# Patient Record
Sex: MALE | Race: WHITE | NOT HISPANIC OR LATINO | Employment: PART TIME
[De-identification: names, ages, dates, MRNs, and addresses within clinical notes are randomized per-mention and may not be internally consistent; named-entity substitution may affect disease eponyms.]

---
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
## 2023-03-06 ENCOUNTER — HOSPITAL ENCOUNTER (EMERGENCY)
Facility: CLINIC | Age: 18
Discharge: HOME | End: 2023-03-06
Attending: EMERGENCY MEDICINE | Admitting: EMERGENCY MEDICINE
Payer: COMMERCIAL

## 2023-03-06 VITALS
DIASTOLIC BLOOD PRESSURE: 84 MMHG | SYSTOLIC BLOOD PRESSURE: 130 MMHG | RESPIRATION RATE: 18 BRPM | HEART RATE: 80 BPM | OXYGEN SATURATION: 100 % | TEMPERATURE: 98.1 F

## 2023-03-06 DIAGNOSIS — G40.909 RECURRENT SEIZURES (H): ICD-10-CM

## 2023-03-06 LAB
ANION GAP SERPL CALCULATED.3IONS-SCNC: 9 MMOL/L (ref 7–15)
BASOPHILS # BLD AUTO: 0.1 10E3/UL (ref 0–0.2)
BASOPHILS NFR BLD AUTO: 1 %
BUN SERPL-MCNC: 13.3 MG/DL (ref 6–20)
CALCIUM SERPL-MCNC: 9.7 MG/DL (ref 8.6–10)
CHLORIDE SERPL-SCNC: 101 MMOL/L (ref 98–107)
CREAT SERPL-MCNC: 1.14 MG/DL (ref 0.67–1.17)
DEPRECATED HCO3 PLAS-SCNC: 28 MMOL/L (ref 22–29)
EOSINOPHIL # BLD AUTO: 0.1 10E3/UL (ref 0–0.7)
EOSINOPHIL NFR BLD AUTO: 1 %
ERYTHROCYTE [DISTWIDTH] IN BLOOD BY AUTOMATED COUNT: 14.6 % (ref 10–15)
GFR SERPL CREATININE-BSD FRML MDRD: >90 ML/MIN/1.73M2
GLUCOSE SERPL-MCNC: 82 MG/DL (ref 70–99)
HCT VFR BLD AUTO: 48.4 % (ref 40–53)
HGB BLD-MCNC: 15.8 G/DL (ref 13.3–17.7)
HOLD SPECIMEN: NORMAL
IMM GRANULOCYTES # BLD: 0 10E3/UL
IMM GRANULOCYTES NFR BLD: 0 %
LYMPHOCYTES # BLD AUTO: 2.2 10E3/UL (ref 0.8–5.3)
LYMPHOCYTES NFR BLD AUTO: 25 %
MCH RBC QN AUTO: 26 PG (ref 26.5–33)
MCHC RBC AUTO-ENTMCNC: 32.6 G/DL (ref 31.5–36.5)
MCV RBC AUTO: 80 FL (ref 78–100)
MONOCYTES # BLD AUTO: 0.7 10E3/UL (ref 0–1.3)
MONOCYTES NFR BLD AUTO: 8 %
NEUTROPHILS # BLD AUTO: 5.6 10E3/UL (ref 1.6–8.3)
NEUTROPHILS NFR BLD AUTO: 65 %
NRBC # BLD AUTO: 0 10E3/UL
NRBC BLD AUTO-RTO: 0 /100
PLATELET # BLD AUTO: 254 10E3/UL (ref 150–450)
POTASSIUM SERPL-SCNC: 4.2 MMOL/L (ref 3.4–5.3)
RBC # BLD AUTO: 6.07 10E6/UL (ref 4.4–5.9)
SODIUM SERPL-SCNC: 138 MMOL/L (ref 136–145)
TROPONIN T SERPL HS-MCNC: <6 NG/L
WBC # BLD AUTO: 8.7 10E3/UL (ref 4–11)

## 2023-03-06 PROCEDURE — 85025 COMPLETE CBC W/AUTO DIFF WBC: CPT | Performed by: EMERGENCY MEDICINE

## 2023-03-06 PROCEDURE — 80048 BASIC METABOLIC PNL TOTAL CA: CPT | Performed by: EMERGENCY MEDICINE

## 2023-03-06 PROCEDURE — 96374 THER/PROPH/DIAG INJ IV PUSH: CPT

## 2023-03-06 PROCEDURE — 99284 EMERGENCY DEPT VISIT MOD MDM: CPT | Mod: 25

## 2023-03-06 PROCEDURE — 84484 ASSAY OF TROPONIN QUANT: CPT | Performed by: EMERGENCY MEDICINE

## 2023-03-06 PROCEDURE — 250N000011 HC RX IP 250 OP 636: Performed by: EMERGENCY MEDICINE

## 2023-03-06 PROCEDURE — 93005 ELECTROCARDIOGRAM TRACING: CPT

## 2023-03-06 PROCEDURE — 36415 COLL VENOUS BLD VENIPUNCTURE: CPT | Performed by: EMERGENCY MEDICINE

## 2023-03-06 RX ORDER — LEVETIRACETAM 15 MG/ML
1500 INJECTION INTRAVASCULAR ONCE
Status: COMPLETED | OUTPATIENT
Start: 2023-03-06 | End: 2023-03-06

## 2023-03-06 RX ORDER — LEVETIRACETAM 750 MG/1
750 TABLET ORAL 2 TIMES DAILY
Qty: 180 TABLET | Refills: 0 | Status: SHIPPED | OUTPATIENT
Start: 2023-03-06

## 2023-03-06 RX ADMIN — LEVETIRACETAM 1500 MG: 15 INJECTION INTRAVENOUS at 16:53

## 2023-03-06 ASSESSMENT — ENCOUNTER SYMPTOMS
NUMBNESS: 0
HEADACHES: 0
WEAKNESS: 0
SEIZURES: 1
CHEST TIGHTNESS: 1
SHORTNESS OF BREATH: 0
ABDOMINAL PAIN: 0
VOMITING: 0

## 2023-03-06 ASSESSMENT — ACTIVITIES OF DAILY LIVING (ADL): ADLS_ACUITY_SCORE: 35

## 2023-03-06 NOTE — DISCHARGE INSTRUCTIONS
Do not drive a car until you have been cleared by a Neurologist and seizure-free for at least 3 months

## 2023-03-06 NOTE — ED NOTES
Rapid Assessment Note    History:   Josh Reinoso is a 17 year old male who presents with unwitnessed seizure yesterday. States he stopped taking his seizure medication about 3 weeks ago, as it ran out. Was prescribed Levetiracetam 750 mg which was prescribed 11/26/22. States that usually when he had seizures he didn't feel anything upon waking up but yesterday he woke up and felt a chest tightness. He denies any headache, shortness of breath, vomiting, abdominal pain, numbness, weakness.    Exam:   Nursing note and vitals reviewed.  Constitutional:  Appears well-developed and well-nourished.   HENT:   Head:    Atraumatic.   Mouth/Throat:   Oropharynx is clear and moist. No oropharyngeal exudate.   Eyes:    Pupils are equal, round, and reactive to light.   Neck:    Normal range of motion. Neck supple.      No tracheal deviation present. No thyromegaly present.   Cardiovascular:  Normal rate, regular rhythm, no murmur   Pulmonary/Chest: Breath sounds are clear and equal without wheezes or crackles.  Abdominal:   Soft. Bowel sounds are normal. Exhibits no distension and      no mass. There is no tenderness.      There is no rebound and no guarding.   Musculoskeletal:  Exhibits no edema.   Lymphadenopathy:  No cervical adenopathy.   Neurological:   Alert and oriented to person, place, and time. GCS 15.  CN 2-12 intact.  and proximal upper extremity strength strong and equal.  Bilateral lower extremity strength strong and equal, including strong dorsiflexion and plantarflexion strength.  Sensation intact and equal to the face, arms and legs.  No facial droop or weakness. Normal speech.  Follows commands and answers questions normally.    Skin:    Skin is warm and dry. No rash noted. No pallor.       ECG results from 03/06/23   EKG 12 lead     Value    Systolic Blood Pressure     Diastolic Blood Pressure     Ventricular Rate 60    Atrial Rate 60    HI Interval 182    QRS Duration 122        QTc 392    P Axis  37    R AXIS 85    T Axis 35    Interpretation ECG      Sinus rhythm  Right bundle branch block  Abnormal ECG  When compared with ECG of 13-JUL-2020 20:00,  QT has shortened         Plan of Care:   I evaluated the patient and developed an initial plan of care. I discussed this plan and explained that I, or one of my partners, would be returning to complete the evaluation.       I, Cristóbal Clarknovemerita, am serving as a scribe to document services personally performed by Eufemia Melendrez MD, based on my observations and the provider's statements to me.    3/6/2023  [facility]    Portions of this medical record were completed by a scribe. UPON MY REVIEW AND AUTHENTICATION BY ELECTRONIC SIGNATURE, this confirms (a) I performed the applicable clinical services, and (b) the record is accurate.        Eufemia Melendrez MD  03/06/23 [id number]

## 2023-03-06 NOTE — ED TRIAGE NOTES
Pt presents to ED with concerns that he is having mini seizures.  Feels spasms in his chest. Started 3 days ago. Occurring one to times each day and is awake when it occurs. Denies chest pain. Feels wheezy when it happens.   Pt reports that he was on seizure meds for history of seizure, but the seizure activity that he was started on the meds for, he was unaware of when the seizures were happening. Pt also states he does not take the seizure medicine he was on as prescribed. Has had no neuro follow up.

## 2023-03-06 NOTE — ED PROVIDER NOTES
History     Chief Complaint:  Seizures       The history is provided by the patient.      Josh Reinoso is a 17 year old male who presents with unwitnessed seizure yesterday. States he stopped taking his seizure medication about 3 weeks ago, as it ran out. Was prescribed Levetiracetam 750 mg which was prescribed 11/26/22. States that usually when he had seizures he didn't feel anything upon waking up but yesterday he woke up and felt a chest tightness. No incontinence or laceration of the tongue. He denies any headache, shortness of breath, vomiting, abdominal pain, numbness, weakness, fever/chills or cough.    Independent Historian:   None - Patient Only    Review of External Notes: none      ROS:  Review of Systems   Respiratory: Positive for chest tightness. Negative for shortness of breath.    Gastrointestinal: Negative for abdominal pain and vomiting.   Neurological: Positive for seizures. Negative for weakness, numbness and headaches.   All other systems reviewed and are negative.      Allergies:  The patient has no known allergies      Medications:    The patient has stopped taking his Levetiracetam    Past Medical History:    Seizures     Social History:   reports that he has been smoking. He has never used smokeless tobacco. He reports current alcohol use. He reports current drug use. Drug: Marijuana.  PCP: No Ref-Primary, Physician     Physical Exam     Patient Vitals for the past 24 hrs:   BP Temp Temp src Pulse Resp SpO2   03/06/23 1352 130/84 98.1  F (36.7  C) Temporal 80 18 100 %        Physical Exam  Nursing note and vitals reviewed.  Constitutional:             Appears well-developed and well-nourished.   HENT:   Head:                           Atraumatic.   Mouth/Throat:              Oropharynx is clear and moist. No laceration to the tongue. No oropharyngeal exudate.   Eyes:                           Pupils are equal, round, and reactive to light.   Neck:                           Normal range  of motion. Neck supple.                                       No tracheal deviation present. No thyromegaly present.   Cardiovascular:           Normal rate, regular rhythm, no murmur   Pulmonary/Chest:       Breath sounds are clear and equal without wheezes or crackles.  Abdominal:                  Soft. Bowel sounds are normal. Exhibits no distension and                                       no mass. There is no tenderness.                                       There is no rebound and no guarding.   Musculoskeletal:         Exhibits no edema.   Lymphadenopathy:     No cervical adenopathy.   Neurological:               Alert and oriented to person, place, and time. GCS 15.  CN 2-12 intact.  and proximal upper extremity strength strong and equal.  Bilateral lower extremity strength strong and equal, including strong dorsiflexion and plantarflexion strength.  Sensation intact and equal to the face, arms and legs.  No facial droop or weakness. Normal speech.  Follows commands and answers questions normally.   Skin:                            Skin is warm and dry. No rash noted. No pallor.     Emergency Department Course     ECG results from 03/06/23   EKG 12 lead     Value    Systolic Blood Pressure     Diastolic Blood Pressure     Ventricular Rate 60    Atrial Rate 60    WA Interval 182    QRS Duration 122        QTc 392    P Axis 37    R AXIS 85    T Axis 35    Interpretation ECG      Sinus rhythm  Right bundle branch block  Abnormal ECG  When compared with ECG of 13-JUL-2020 20:00,  QT has shortened       Laboratory:  Labs Ordered and Resulted from Time of ED Arrival to Time of ED Departure   CBC WITH PLATELETS AND DIFFERENTIAL - Abnormal       Result Value    WBC Count 8.7      RBC Count 6.07 (*)     Hemoglobin 15.8      Hematocrit 48.4      MCV 80      MCH 26.0 (*)     MCHC 32.6      RDW 14.6      Platelet Count 254      % Neutrophils 65      % Lymphocytes 25      % Monocytes 8      % Eosinophils 1       % Basophils 1      % Immature Granulocytes 0      NRBCs per 100 WBC 0      Absolute Neutrophils 5.6      Absolute Lymphocytes 2.2      Absolute Monocytes 0.7      Absolute Eosinophils 0.1      Absolute Basophils 0.1      Absolute Immature Granulocytes 0.0      Absolute NRBCs 0.0     BASIC METABOLIC PANEL - Normal    Sodium 138      Potassium 4.2      Chloride 101      Carbon Dioxide (CO2) 28      Anion Gap 9      Urea Nitrogen 13.3      Creatinine 1.14      Calcium 9.7      Glucose 82      GFR Estimate >90     TROPONIN T, HIGH SENSITIVITY        Emergency Department Course & Assessments:         Interventions:  Medications   levETIRAcetam (KEPPRA) intermittent infusion 1,500 mg (1,500 mg Intravenous $New Bag 3/6/23 165)        Assessments:  1642 I examined the patient and obtained history     Independent Interpretation (X-rays, CTs, rhythm strip):  I reviewed his previous CT head imaging from 9/8/20, which appears unremarkable.    Consultations/Discussion of Management or Tests:  None        Social Determinants of Health affecting care:   Healthcare Access/Compliance    Disposition:  The patient was discharged to home.     Impression & Plan    CMS Diagnoses: None    Medical Decision Making:  This patient has a history of seizures but ran out of his anti-seizure medication and arrives today due to a recurrent seizure.  I do not feel that any emergent/urgent brain imaging is indicated today since he does not have any headache, vomiting or any other neurologic concerns today and there has not been any head trauma. No sign of meningitis or infection.  Electrolytes and CBC are normal.  No sign of cardiac problem and his high sensitivity troponin and ECG are normal. No sign of traumatic injury.  He was loaded with IV Keppra here and discharged with his usual dose of Keppra refilled.  He was referred to follow-up with [facility] within the next week.  He was told seizure precautions and told not to  drive a car until he has been seizure-free for at least 3 months and cleared by a Neurologist.  He was told to return for any problems.    Diagnosis:    ICD-10-CM    1. Recurrent seizures (H)  G40.909            Discharge Medications:  New Prescriptions    LEVETIRACETAM (KEPPRA) 750 MG TABLET    Take 1 tablet (750 mg) by mouth 2 times daily        Scribe Disclosure:  I, Cristóbal Han, am serving as a scribe at 5:03 PM on 3/6/2023 to document services personally performed by Eufemia Melendrez MD based on my observations and the provider's statements to me.     3/6/2023   Eufemia Melendrez MD [place], Cheri Lee, MD  03/06/23 5628       Eufemia Melendrez MD  03/06/23 6880

## 2023-03-07 LAB
ATRIAL RATE - MUSE: 60 BPM
DIASTOLIC BLOOD PRESSURE - MUSE: NORMAL MMHG
INTERPRETATION ECG - MUSE: NORMAL
P AXIS - MUSE: 37 DEGREES
PR INTERVAL - MUSE: 182 MS
QRS DURATION - MUSE: 122 MS
QT - MUSE: 392 MS
QTC - MUSE: 392 MS
R AXIS - MUSE: 85 DEGREES
SYSTOLIC BLOOD PRESSURE - MUSE: NORMAL MMHG
T AXIS - MUSE: 35 DEGREES
VENTRICULAR RATE- MUSE: 60 BPM

## 2023-05-20 ENCOUNTER — MEDICAL CORRESPONDENCE (OUTPATIENT)
Dept: HEALTH INFORMATION MANAGEMENT | Facility: CLINIC | Age: 18
End: 2023-05-20
Payer: COMMERCIAL

## 2023-05-20 ENCOUNTER — HOSPITAL ENCOUNTER (EMERGENCY)
Facility: CLINIC | Age: 18
Discharge: HOME | End: 2023-05-20
Attending: EMERGENCY MEDICINE | Admitting: EMERGENCY MEDICINE
Payer: COMMERCIAL

## 2023-05-20 VITALS — HEART RATE: 83 BPM | RESPIRATION RATE: 16 BRPM | OXYGEN SATURATION: 100 %

## 2023-05-20 DIAGNOSIS — G40.909 RECURRENT SEIZURES (H): ICD-10-CM

## 2023-05-20 PROCEDURE — 99283 EMERGENCY DEPT VISIT LOW MDM: CPT

## 2023-05-20 ASSESSMENT — ACTIVITIES OF DAILY LIVING (ADL)
ADLS_ACUITY_SCORE: 35
ADLS_ACUITY_SCORE: 35

## 2023-05-20 NOTE — ED NOTES
Bed: ED07  Expected date: 5/20/23  Expected time:   Means of arrival: Ambulance  Comments:  [facility]
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
## 2023-05-20 NOTE — ED NOTES
Assisted with patient triage (ambulance). Applied monitoring equipment onto Patient (Pulse ox and BP).  Pt. Cooperative at this time

## 2023-05-20 NOTE — ED NOTES
Respiratory (Adult) Airway WDL: WDL   Neuro Cognitive (Adult) Cognitive/Neuro/Behavioral WDL:  (pt has hx. of seizures and is prescribed a medication for seizures.)  Additional Documentation:  (pt has a hx of seizures unsure if he had a seizure last evening or today. pt has a hx of seizure and is presscribed medication for it but is unsure if he took it last evening or today. not inc. of urine or stool. pt A&O name, birthday, pres. and year.)   Skin WDL Skin Comments: abrason to left elbow. right big toe.

## 2024-02-13 ENCOUNTER — APPOINTMENT (OUTPATIENT)
Dept: CT IMAGING | Facility: CLINIC | Age: 19
End: 2024-02-13
Attending: EMERGENCY MEDICINE

## 2024-02-13 ENCOUNTER — HOSPITAL ENCOUNTER (EMERGENCY)
Facility: CLINIC | Age: 19
Discharge: HOME | End: 2024-02-13
Attending: EMERGENCY MEDICINE | Admitting: EMERGENCY MEDICINE

## 2024-02-13 VITALS
DIASTOLIC BLOOD PRESSURE: 68 MMHG | TEMPERATURE: 97.5 F | SYSTOLIC BLOOD PRESSURE: 119 MMHG | OXYGEN SATURATION: 100 % | HEART RATE: 58 BPM | RESPIRATION RATE: 20 BRPM

## 2024-02-13 DIAGNOSIS — G40.909 RECURRENT SEIZURES (H): ICD-10-CM

## 2024-02-13 LAB
ANION GAP SERPL CALCULATED.3IONS-SCNC: 9 MMOL/L (ref 7–15)
BUN SERPL-MCNC: 9.2 MG/DL (ref 6–20)
CALCIUM SERPL-MCNC: 9.3 MG/DL (ref 8.6–10)
CHLORIDE SERPL-SCNC: 102 MMOL/L (ref 98–107)
CREAT SERPL-MCNC: 1.03 MG/DL (ref 0.67–1.17)
DEPRECATED HCO3 PLAS-SCNC: 28 MMOL/L (ref 22–29)
EGFRCR SERPLBLD CKD-EPI 2021: >90 ML/MIN/1.73M2
GLUCOSE SERPL-MCNC: 74 MG/DL (ref 70–99)
HOLD SPECIMEN: NORMAL
POTASSIUM SERPL-SCNC: 3.8 MMOL/L (ref 3.4–5.3)
SODIUM SERPL-SCNC: 139 MMOL/L (ref 135–145)

## 2024-02-13 PROCEDURE — 70450 CT HEAD/BRAIN W/O DYE: CPT

## 2024-02-13 PROCEDURE — 80048 BASIC METABOLIC PNL TOTAL CA: CPT | Performed by: EMERGENCY MEDICINE

## 2024-02-13 PROCEDURE — 250N000013 HC RX MED GY IP 250 OP 250 PS 637: Performed by: EMERGENCY MEDICINE

## 2024-02-13 PROCEDURE — 36415 COLL VENOUS BLD VENIPUNCTURE: CPT | Performed by: EMERGENCY MEDICINE

## 2024-02-13 PROCEDURE — 99284 EMERGENCY DEPT VISIT MOD MDM: CPT | Mod: 25

## 2024-02-13 RX ORDER — LEVETIRACETAM 750 MG/1
750 TABLET ORAL 2 TIMES DAILY
Qty: 120 TABLET | Refills: 0 | Status: SHIPPED | OUTPATIENT
Start: 2024-02-13 | End: 2024-04-13

## 2024-02-13 RX ADMIN — LEVETIRACETAM 750 MG: 250 TABLET, FILM COATED ORAL at 12:53

## 2024-02-13 ASSESSMENT — ACTIVITIES OF DAILY LIVING (ADL): ADLS_ACUITY_SCORE: 35

## 2024-02-13 NOTE — ED NOTES
Discharged home, belongings returned, script for new medication sent to pharmacy of choice. Pt cleared to wait in lobby by provider. Reminded to follow-up with primary. Pt did not have any questions or concerns noted upon departure.
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
## 2024-02-13 NOTE — DISCHARGE INSTRUCTIONS
Please be sure to take your seizure medication as prescribed.  Missing any doses can be dangerous and lead to breakthrough seizures.  You should not drive or operate heavy machinery for 90 days.      Please follow up with neurology in 5-7 days.      Return to the ED if you have a recurrent seizure, severe headache, numbness or tingling, confusion, vomiting >2 times or other acute changes.      Discharge Instructions  Recurrent Seizure (Convulsion)    You were seen today for a seizure. The most common reason for a recurrent seizure is having missed a dose of your medication or taken it at a different time than normal. Other things that increase the risk of seizures include fever, sleep deprivation, alcohol, and stress. Although anti-seizure medications (anti-epileptic drugs) work for many people with seizure disorders, some people continue to have seizures even after trying several medications.    Generally, every Emergency Department visit should have a follow-up clinic visit with either a primary or a specialty clinic/provider. Please follow-up as instructed by your emergency provider today.    Return to the Emergency Department if:   You develop a fever over 100.4 F.  You feel much more ill, or develop new symptoms like severe headache.  You have trouble walking, seeing, or develop weakness or numbness in your arms or legs.     What can I do to help myself?  Take your medication exactly as directed, at the right times, and the right doses.   If you develop uncomfortable side effects, do not stop taking your anti-seizure medication without first speaking to your provider.   Do not let your prescription run out. Stopping anti-seizure medication abruptly can put you at risk of a seizure.  While taking an anti-seizure medication, do not start taking any other medications including over-the-counter medications and herbal supplements without first checking with your provider because mixing them can be dangerous.  Do not  drive until you have been rechecked by your provider and have been told it is safe to drive.  If you have a seizure while driving you may cause a motor vehicle accident with injury or death to yourself or others.   Do not swim, climb ladders, or do anything else that would be dangerous if you had another seizure or spell of loss of consciousness, until you are cleared by your provider.    Check your state driving requirements for patients with seizures on the [employer]ile[employer] Website at www.epilepsyfoundation.org/resources/drivingandtravel.cfm.  Do not drink alcohol.  Drinking alcohol increases the risk of seizures and can interfere with the effect of anti-seizure medications.  Start a seizure calendar to record any seizure triggers, such as days when you were sleep-deprived, stressed, drank alcohol, or (if you are a woman) had your period.  Remember, if one medication does not work for you, either because you cannot tolerate the side effects or because you continue to have seizures, your provider can suggest alternate medications or alternate methods of taking the medication.  If you were given a prescription for medicine here today, be sure to read all of the information (including the package insert) that comes with your prescription.  This will include important information about the medicine, its side effects, and any warnings that you need to know about.  The pharmacist who fills the prescription can provide more information and answer questions you may have about the medicine.  If you have questions or concerns that the pharmacist cannot address, please call or return to the Emergency Department.   Remember that you can always come back to the Emergency Department if you are not able to see your regular provider in the amount of time listed above, if you get any new symptoms, or if there is anything that worries you.

## 2024-02-13 NOTE — ED PROVIDER NOTES
History     Chief Complaint:  Seizures    The history is provided by the patient.     Josh Reinoso is a 18 year old male with history of seizure disorder presenting via EMS for evaluation after a seizure. Josh states that he believes he had a seizure this morning, explaining that he woke up at 1000 after going to bed at baseline at 0000 and noticed that his TV was cracked and he had dried blood in his nose. He states that his head hurts and he believes he hit it on his TV. He endorses bladder incontinence. He states that he has not taken his prescribed Keppra (750 mg BID) for two months because he had become confused about the dosing. He notes that he has been experiencing seizures for approximately five years and typically has around five yearly. He denies vomiting, chest pain, abdominal pain, or extremity pain, numbness, or weakness. He denies use of illicit drugs or alcohol. He lives with his grandmother and uncle.    Independent Historian:   None - Patient Only    Review of External Notes:   I reviewed the 5/20/23 ED note for abnormal behavior and seizure    Medications:    Keppra  Zofran    Past Medical History:    Seizure disorder  Polysubstance abuse    Physical Exam   Patient Vitals for the past 24 hrs:   BP Temp Temp src Pulse Resp SpO2   02/13/24 1209 123/70 97.8  F (36.6  C) Oral 84 20 97 %     Physical Exam  General: Resting on the bed.  Head: No obvious trauma to head.  Ears, Nose, Throat:  External ears normal.  Nose normal.  No pharyngeal erythema, swelling or exudate.  Midline uvula.  Clear TMs.  abrasion to the left side of the tongue.  Eyes:  Conjunctivae clear.  Pupils are equal, round, and reactive.   Neck: Normal range of motion.  Neck supple.  Non tender c spine.    CV: Regular rate and rhythm.  No murmurs.      Respiratory: Effort normal and breath sounds normal.  No wheezing or crackles.   Gastrointestinal: Soft.  No distension. There is no tenderness.  There is no rigidity, no rebound  and no guarding.   Musculoskeletal: Normal range of motion.  Non tender extremities to palpations.  Non tender t/l spine without step off or deformity   Neuro: Alert. Moving all extremities appropriately.  Normal speech.  CN II-XII grossly intact, no pronator drift, normal finger-nose-finger, visual fields intact.  Gross muscle strength intact of the proximal and distal bilateral upper and lower extremities.  Sensation intact to light touch in all 4 extremities.   Skin: Skin is warm and dry.  No rash noted.     Emergency Department Course   Imaging:  CT Head w/o Contrast   Final Result   IMPRESSION:   No evidence of acute intracranial hemorrhage, mass, or   herniation.         SHREE THOMPSON MD            SYSTEM ID:  [id number]        Laboratory:  Labs Ordered and Resulted from Time of ED Arrival to Time of ED Departure   BASIC METABOLIC PANEL - Normal       Result Value    Sodium 139      Potassium 3.8      Chloride 102      Carbon Dioxide (CO2) 28      Anion Gap 9      Urea Nitrogen 9.2      Creatinine 1.03      GFR Estimate >90      Calcium 9.3      Glucose 74       Emergency Department Course & Assessments:       Interventions:  Medications   levETIRAcetam (KEPPRA) tablet 750 mg (750 mg Oral $Given 24 1253)      Assessments:  1211 I obtained history and examined the patient as noted above.  1321 I rechecked the patient. I discussed findings and discharge with the patient. All questions answered.     Independent Interpretation (X-rays, CTs, rhythm strip):  I independently reviewed the patient's head CT and note that it is normal.    Consultations/Discussion of Management or Tests:  ED Course as of 24 1324      1233 I spoke with tenisha Blankenship regarding Keppra dosing.       Social Determinants of Health affecting care:   Healthcare Access/Compliance    Disposition:  The patient was discharged.    Impression & Plan    Medical Decision Makin-year-old male presents to Emergency Department  with recurrent seizures.  Vital signs are reassuring.  Broad differentials pursued include not limited to seizure, electrolyte, metabolic, renal disturbance, trauma, subarachnoid, subdural, etc.  Overall patient is well-appearing nontoxic.  He does have stigmata of a seizure with bruising on the left side of his tongue as well as reporting incontinence.  No visualized seizure activity.  He noted some dried blood on his face.  Given concern for head injury head CT was obtained and fortunately of acute current hemorrhage, mass or herniation.  His exam is nonfocal no sign of acute stroke.  BMP without evidence of acute electrolyte, metabolic or renal dysfunction.  No fevers otherwise well-appearing, nontoxic, no concerns for infection.  Patient reports noncompliance with Keppra.  Consulted pharmacy who recommended resumption of Keppra dosing first dose given the emergency department.  Suspect recurrent seizure related to medication noncompliance.  Patient has not followed with neurology in quite some time.  Neurology referral also placed.  Recommend close follow-up with neurology.  Return precautions were discussed and patient was discharged home.    Diagnosis:    ICD-10-CM    1. Recurrent seizures (H)  G40.909 Adult Neurology  Referral        Discharge Medications:  New Prescriptions    LEVETIRACETAM (KEPPRA) 750 MG TABLET    Take 1 tablet (750 mg) by mouth 2 times daily for 60 days     Scribe Disclosure:  Donovan KILPATRICK, am serving as a scribe at 12:06 PM on 2/13/2024 to document services personally performed by Jyoti Oconnell MD based on my observations and the provider's statements to me.   2/13/2024   Jyoti Oconnell MD Bennett, Jennifer L, MD  02/13/24 [id number]

## 2024-04-13 ENCOUNTER — TELEPHONE (OUTPATIENT)
Dept: NEUROLOGY | Facility: CLINIC | Age: 19
End: 2024-04-13

## 2024-04-13 NOTE — TELEPHONE ENCOUNTER
Reached out to patient to schedule New Seizure per referral received 02/13/2024. Invalid phone number on file. Letter sent.